# Patient Record
Sex: FEMALE | Race: ASIAN | NOT HISPANIC OR LATINO | URBAN - METROPOLITAN AREA
[De-identification: names, ages, dates, MRNs, and addresses within clinical notes are randomized per-mention and may not be internally consistent; named-entity substitution may affect disease eponyms.]

---

## 2019-01-25 ENCOUNTER — EMERGENCY (EMERGENCY)
Facility: HOSPITAL | Age: 49
LOS: 1 days | Discharge: HOME | End: 2019-01-25
Attending: EMERGENCY MEDICINE | Admitting: EMERGENCY MEDICINE
Payer: COMMERCIAL

## 2019-01-25 VITALS
OXYGEN SATURATION: 100 % | TEMPERATURE: 98 F | HEART RATE: 94 BPM | SYSTOLIC BLOOD PRESSURE: 157 MMHG | DIASTOLIC BLOOD PRESSURE: 107 MMHG | RESPIRATION RATE: 18 BRPM

## 2019-01-25 VITALS
SYSTOLIC BLOOD PRESSURE: 148 MMHG | HEART RATE: 67 BPM | RESPIRATION RATE: 16 BRPM | DIASTOLIC BLOOD PRESSURE: 92 MMHG | TEMPERATURE: 98 F | OXYGEN SATURATION: 100 %

## 2019-01-25 PROCEDURE — 93010 ELECTROCARDIOGRAM REPORT: CPT | Mod: NC

## 2019-01-25 PROCEDURE — 99283 EMERGENCY DEPT VISIT LOW MDM: CPT | Mod: 25

## 2019-01-25 RX ORDER — MECLIZINE HCL 12.5 MG
25 TABLET ORAL ONCE
Qty: 0 | Refills: 0 | Status: COMPLETED | OUTPATIENT
Start: 2019-01-25 | End: 2019-01-25

## 2019-01-25 RX ORDER — MECLIZINE HCL 12.5 MG
1 TABLET ORAL
Qty: 15 | Refills: 0 | OUTPATIENT
Start: 2019-01-25 | End: 2019-01-29

## 2019-01-25 RX ADMIN — Medication 25 MILLIGRAM(S): at 23:12

## 2019-01-25 NOTE — ED ADULT NURSE NOTE - CHIEF COMPLAINT QUOTE
Pt arrives c/o dizziness "like the room is spinning around me" since 5pm, denies headache or vision changes, endorses also having Nausea/ nbnb emesis multiple times w/o abdominal pain or discomfort.  No diarrhea.  No recent ravel/sick contacts.  Denies hx vertigo or signficant PMHx.  EKG done

## 2019-01-25 NOTE — ED PROVIDER NOTE - NS ED ROS FT
CONST: no fevers, no chills, no trauma  EYES: no pain, no visual disturbances  ENT: no sore throat, no epistaxis, no rhinorrhea, no hearing changes  CV: no chest pain, no palpitations, no orthopnea, no extremity pain or swelling  RESP: no shortness of breath, no cough, no sputum, no pleurisy, no wheezing  ABD: no abdominal pain, no nausea, no vomiting, no diarrhea, no black or bloody stool  : no dysuria, no hematuria, no frequency, no urgency  MSK: no back pain, no neck pain, no extremity pain  NEURO: no headache, no sensory disturbances, no focal weakness, + dizziness  HEME: no easy bleeding or bruising  SKIN: no diaphoresis, no rash

## 2019-01-25 NOTE — ED ADULT NURSE NOTE - OBJECTIVE STATEMENT
Patient endorsing dizziness  and room spinning today while drive worsened laying flat or position changes. Had episode of NBNB emesis. Denies chest, sob, weakness, blurry vision. MD Salamanca performing neuro eval at bedside. Neuro systems intact. Denies med hx.

## 2019-01-25 NOTE — ED PROVIDER NOTE - PROGRESS NOTE DETAILS
Caroline PGY2: pt reports improvement in symptoms s/p Meclizine, ambulatory without assistance. Will dc with meclizine, pcp fu and ENT fu

## 2019-01-25 NOTE — ED PROVIDER NOTE - MEDICAL DECISION MAKING DETAILS
49 yo F presenting with acute onset dizziness with n/v. will obtain ekg, tx with meclizine and reassess.

## 2019-01-25 NOTE — ED PROVIDER NOTE - NSFOLLOWUPCLINICS_GEN_ALL_ED_FT
[facility]  ENT  [address], [address]  [place] [address]  Phone: [phone]  Fax:   Follow Up Time:

## 2019-01-25 NOTE — ED PROVIDER NOTE - OBJECTIVE STATEMENT
49 yo F no known medical problems presenting with acute onset dizziness that started at 5 pm while driving, with assocaited nausea/vomiting. 47 yo F no known medical problems presenting with acute onset dizziness that started at 5 pm while driving, with associated nausea/vomiting. Describes dizziness as room spinning, relieved with eyes closed, unable to ambulate 2/2 dizziness. Denies blurry vision/double vision. No falls/trauma.

## 2019-01-25 NOTE — ED ADULT NURSE NOTE - NSIMPLEMENTINTERV_GEN_ALL_ED
Implemented All Fall Risk Interventions:  [place] to call system. Call bell, personal items and telephone within reach. Instruct patient to call for assistance. Room bathroom lighting operational. Non-slip footwear when patient is off stretcher. Physically safe environment: no spills, clutter or unnecessary equipment. Stretcher in lowest position, wheels locked, appropriate side rails in place. Provide visual cue, wrist band, yellow gown, etc. Monitor gait and stability. Monitor for mental status changes and reorient to person, place, and time. Review medications for side effects contributing to fall risk. Reinforce activity limits and safety measures with patient and family.

## 2019-01-25 NOTE — ED PROVIDER NOTE - PHYSICAL EXAMINATION
VITALS: reviewed  GEN: NAD, A & O x 4  HEAD/EYES: NCAT, PERRL, EOMI, anicteric sclerae, no conjunctival pallor  ENT: mucus membranes moist, oropharynx WNL, trachea midline, no JVD  RESP: lungs CTA with equal breath sounds bilaterally, chest wall nontender and atraumatic  CV: heart with reg rhythm S1, S2, no murmur; distal pulses intact and symmetric bilaterally  ABDOMEN: normoactive bowel sounds, soft, nondistended, nontender, no palpable masses  : no CVAT  MSK: extremities atraumatic and nontender, no edema, no asymmetry. the back is without midline or lateral tenderness, there is no spinal deformity or stepoff and the back is ranged painlessly. the neck has no midline tenderness, deformity, or stepoff, and is ranged painlessly.  SKIN: warm, dry, no rash, no bruising, no cyanosis. color appropriate for ethnicity  NEURO: alert, mentating appropriately, no facial asymmetry. gross sensation, motor intact, gait unsteady 2/2 dizziness, romberg , no pronator drift, FTN normal  PSYCH: Affect appropriate VITALS: reviewed  GEN: NAD, A & O x 4  HEAD/EYES: NCAT, PERRL, EOMI, anicteric sclerae, no conjunctival pallor  ENT: mucus membranes moist, oropharynx WNL, trachea midline, no JVD  RESP: lungs CTA with equal breath sounds bilaterally, chest wall nontender and atraumatic  CV: heart with reg rhythm S1, S2, no murmur; distal pulses intact and symmetric bilaterally  ABDOMEN: normoactive bowel sounds, soft, nondistended, nontender, no palpable masses  : no CVAT  MSK: extremities atraumatic and nontender, no edema, no asymmetry. the back is without midline or lateral tenderness, there is no spinal deformity or stepoff and the back is ranged painlessly. the neck has no midline tenderness, deformity, or stepoff, and is ranged painlessly.  SKIN: warm, dry, no rash, no bruising, no cyanosis. color appropriate for ethnicity  NEURO: alert, mentating appropriately, no facial asymmetry. gross sensation, motor intact, gait unsteady 2/2 dizziness, Romberg , no pronator drift, FTN normal  PSYCH: Affect appropriate

## 2019-01-25 NOTE — ED PROVIDER NOTE - NSFOLLOWUPINSTRUCTIONS_ED_ALL_ED_FT
Rest, drink plenty of fluids.  Advance activity as tolerated.  Continue all previously prescribed medications as directed.  Follow up with your primary care physician in 48-72 hours- bring copies of your results.  Return to the ER for worsening or persistent symptoms, and/or ANY NEW OR CONCERNING SYMPTOMS. If you have issues obtaining follow up, please call: [phone] to obtain a doctor or specialist who takes your insurance in your area.    You presented to the ED with complaints of dizziness, which improved with Meclizine. A prescription was sent to your pharmacy, take Meclizine every 8 hours as needed for dizziness. Call ENT to schedule an appointment for follow up.     See attached for more information on Vertigo

## 2019-01-25 NOTE — ED PROVIDER NOTE - ATTENDING CONTRIBUTION TO CARE
I was physically present for the E/M service provided. I agree with above history, physical, and plan which I have reviewed and edited where appropriate. I was physically present for the key portions of the service provided.    Salamanca: 47 yo F no known medical problems presenting with acute onset dizziness that started at 5 pm while driving, with associated nausea/vomiting. Describes dizziness as room spinning, relieved with eyes closed, unable to ambulate 2/2 dizziness. no fever, no chills, no visual changes, no headache, no numbness or tingling, no sob, no cough, no cp, no palpitations, no leg swelling, no syncope, no abd pain, no n/v/d, no dysuria, no rashes.  no hearing loss, no tinnitus, no recent illness. no trauma.  sx rather rapid and severe.    *GEN:   comfortable, in no apparent distress, AOx3  *EYES:   PERRL, extra-occular movements intact  *HEENT:   airway patent, moist mucosal membranes, uvula midline  *CV:   regular rate and rhythm, normal S1/S2, no murmur  *RESP:   clear to auscultation bilaterally, non-labored, speaking in full sentences  *ABD:   soft, non tender, no guarding  *MSK:   no musculoskeletal tenderness, 5/5 strength, moving all extremity  *SKIN:   dry, intact, no rash  *NEURO:   AOx3, no focal weakness or loss of sensation,  GCS 15, CN II-XII intact, normal finger to nose, neg pronator drift,     a/p: likely peripheral vertigo.  trial of meclizine, ekg, fs and re-assess.  if neurologically intact dc home with neuro/ent and meclizine.

## 2021-08-20 ENCOUNTER — EMERGENCY (EMERGENCY)
Facility: HOSPITAL | Age: 51
LOS: 1 days | Discharge: HOME | End: 2021-08-20
Admitting: EMERGENCY MEDICINE
Payer: COMMERCIAL

## 2021-08-20 VITALS
TEMPERATURE: 98 F | HEART RATE: 92 BPM | SYSTOLIC BLOOD PRESSURE: 153 MMHG | RESPIRATION RATE: 16 BRPM | DIASTOLIC BLOOD PRESSURE: 81 MMHG | OXYGEN SATURATION: 100 %

## 2021-08-20 PROCEDURE — 99283 EMERGENCY DEPT VISIT LOW MDM: CPT

## 2021-08-20 NOTE — ED PROVIDER NOTE - LOWER EXTREMITY EXAM, RIGHT
mild pain with ROM; no swelling; no redness; no crepitus; no warmth; no calf tenderness; no palpable cords; no LE edema; 5/5 strength; sensation intact

## 2021-08-20 NOTE — ED PROVIDER NOTE - OBJECTIVE STATEMENT
Pt is a 51 y/o no pertinent PMHx p/w right knee pain x 1.5 months. Pt is a 51 y/o no pertinent PMHx p/w right knee pain x 1.5 months.  Pt reports she developed sharp right lateral knee pain which occurs upon standing and walking.  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  She reports at times, at the end of a work day, right knee appears swollen.  She reports no pain when seated; worsens to 8/10 upon standing/walking.  Pt reports she applies icy hot which provides mild relief.  Pt denies any fevers, chills, trauma, falls, redness, illicit drug use, inability to walk, calf pain, pain elsewhere or any other specific complaints.

## 2021-08-20 NOTE — ED PROVIDER NOTE - CLINICAL SUMMARY MEDICAL DECISION MAKING FREE TEXT BOX
Pt is a 51 y/o no pertinent PMHx p/w right knee pain x 1.5 months. -- likely musculoskeletal knee pain; not concerning for fracture or dislocation; not concerning for septic joint -- pain control, ortho follow up

## 2021-08-20 NOTE — ED PROVIDER NOTE - NSFOLLOWUPINSTRUCTIONS_ED_ALL_ED_FT
Advance activity as tolerated.  Continue all previously prescribed medications as directed unless otherwise instructed.  Take Motrin (also sold as Advil or Ibuprofen) 400-600 mg (two or three 200 mg over the counter pills) every 8 hours as needed for moderate pain or fevers-- take with food. Take Tylenol 650mg (Two 325 mg pills) every 4-6 hours as needed for pain or fevers.  Keep extremity elevated and wrapped.  Apply cool compresses to affected area for 15 minutes, 3-4 times per day.  Follow up with your primary care physician and orthopedics (referral list provided or call [phone] to make an appointment with the orthopedics clinic) in 48-72 hours- bring copies of your results.  Return to the ER for worsening or persistent symptoms, including but not limited to worsening/persistent pain, redness, swelling, fevers, difficulty walking, falls, and/or ANY NEW OR CONCERNING SYMPTOMS. If you have issues obtaining follow up, please call: [phone] to obtain a doctor or specialist who takes your insurance in your area.  You may call [phone] to make an appointment with the internal medicine clinic.

## 2021-08-20 NOTE — ED PROVIDER NOTE - PATIENT PORTAL LINK FT
You can access the FollowMyHealth Patient Portal offered by [facility] by registering at the following website: [email or web address]. By joining [employer]’s FollowMyHealth portal, you will also be able to view your health information using other applications (apps) compatible with our system.